# Patient Record
Sex: MALE | Race: WHITE
[De-identification: names, ages, dates, MRNs, and addresses within clinical notes are randomized per-mention and may not be internally consistent; named-entity substitution may affect disease eponyms.]

---

## 2018-09-10 ENCOUNTER — HOSPITAL ENCOUNTER (INPATIENT)
Dept: HOSPITAL 91 - REC | Age: 58
LOS: 1 days | Discharge: HOME | DRG: 520 | End: 2018-09-11
Payer: COMMERCIAL

## 2018-09-10 ENCOUNTER — HOSPITAL ENCOUNTER (INPATIENT)
Age: 58
LOS: 1 days | Discharge: HOME | DRG: 520 | End: 2018-09-11

## 2018-09-10 DIAGNOSIS — F90.9: ICD-10-CM

## 2018-09-10 DIAGNOSIS — E88.2: ICD-10-CM

## 2018-09-10 DIAGNOSIS — M48.061: ICD-10-CM

## 2018-09-10 DIAGNOSIS — M51.16: Primary | ICD-10-CM

## 2018-09-10 PROCEDURE — 0SB40ZZ EXCISION OF LUMBOSACRAL DISC, OPEN APPROACH: ICD-10-PCS

## 2018-09-10 PROCEDURE — 80048 BASIC METABOLIC PNL TOTAL CA: CPT

## 2018-09-10 PROCEDURE — 85018 HEMOGLOBIN: CPT

## 2018-09-10 PROCEDURE — 86900 BLOOD TYPING SEROLOGIC ABO: CPT

## 2018-09-10 PROCEDURE — 88311 DECALCIFY TISSUE: CPT

## 2018-09-10 PROCEDURE — 00BY0ZZ EXCISION OF LUMBAR SPINAL CORD, OPEN APPROACH: ICD-10-PCS

## 2018-09-10 PROCEDURE — 81003 URINALYSIS AUTO W/O SCOPE: CPT

## 2018-09-10 PROCEDURE — 86901 BLOOD TYPING SEROLOGIC RH(D): CPT

## 2018-09-10 PROCEDURE — 85014 HEMATOCRIT: CPT

## 2018-09-10 PROCEDURE — 72020 X-RAY EXAM OF SPINE 1 VIEW: CPT

## 2018-09-10 PROCEDURE — 87086 URINE CULTURE/COLONY COUNT: CPT

## 2018-09-10 PROCEDURE — 97110 THERAPEUTIC EXERCISES: CPT

## 2018-09-10 PROCEDURE — 01NB0ZZ RELEASE LUMBAR NERVE, OPEN APPROACH: ICD-10-PCS

## 2018-09-10 PROCEDURE — 97161 PT EVAL LOW COMPLEX 20 MIN: CPT

## 2018-09-10 PROCEDURE — 88304 TISSUE EXAM BY PATHOLOGIST: CPT

## 2018-09-10 PROCEDURE — 86920 COMPATIBILITY TEST SPIN: CPT

## 2018-09-10 PROCEDURE — 97530 THERAPEUTIC ACTIVITIES: CPT

## 2018-09-10 PROCEDURE — 97116 GAIT TRAINING THERAPY: CPT

## 2018-09-10 PROCEDURE — 86850 RBC ANTIBODY SCREEN: CPT

## 2018-09-10 RX ADMIN — BACITRACIN 1 ML: 50000 INJECTION, POWDER, FOR SOLUTION INTRAMUSCULAR at 06:55

## 2018-09-10 RX ADMIN — BUPIVACAINE HYDROCHLORIDE 1 ML: 2.5 INJECTION, SOLUTION EPIDURAL; INFILTRATION; INTRACAUDAL; PERINEURAL at 06:54

## 2018-09-10 RX ADMIN — PYRIDOXINE HYDROCHLORIDE 1 MLS/HR: 100 INJECTION, SOLUTION INTRAMUSCULAR; INTRAVENOUS at 07:30

## 2018-09-10 RX ADMIN — RANITIDINE 1 MG: 150 TABLET ORAL at 20:28

## 2018-09-10 RX ADMIN — HYDROMORPHONE HYDROCHLORIDE 1 MG: 2 INJECTION INTRAMUSCULAR; INTRAVENOUS; SUBCUTANEOUS at 10:21

## 2018-09-10 RX ADMIN — Medication 1 MG: at 10:03

## 2018-09-10 RX ADMIN — THROMBIN TOPICAL RECOMBINANT 1 UNITS: KIT at 06:55

## 2018-09-10 RX ADMIN — CEFAZOLIN 1 MLS/HR: 1 INJECTION, POWDER, FOR SOLUTION INTRAMUSCULAR; INTRAVENOUS at 13:31

## 2018-09-10 RX ADMIN — Medication 1 MG: at 22:32

## 2018-09-10 RX ADMIN — ASCORBIC ACID, VITAMIN A PALMITATE, CHOLECALCIFEROL, THIAMINE HYDROCHLORIDE, RIBOFLAVIN-5 PHOSPHATE SODIUM, PYRIDOXINE HYDROCHLORIDE, NIACINAMIDE, DEXPANTHENOL, ALPHA-TOCOPHEROL ACETATE, VITAMIN K1, FOLIC ACID, BIOTIN, CYANOCOBALAMIN 1 MLS/HR: 200; 3300; 200; 6; 3.6; 6; 40; 15; 10; 150; 600; 60; 5 INJECTION, SOLUTION INTRAVENOUS at 19:39

## 2018-09-10 RX ADMIN — PYRIDOXINE HYDROCHLORIDE 1 MLS/HR: 100 INJECTION, SOLUTION INTRAMUSCULAR; INTRAVENOUS at 08:00

## 2018-09-10 RX ADMIN — HYDROMORPHONE HYDROCHLORIDE 1 MG: 2 INJECTION INTRAMUSCULAR; INTRAVENOUS; SUBCUTANEOUS at 10:15

## 2018-09-10 RX ADMIN — CEFAZOLIN 1 MLS/HR: 1 INJECTION, POWDER, FOR SOLUTION INTRAMUSCULAR; INTRAVENOUS at 18:01

## 2018-09-10 RX ADMIN — CEFAZOLIN SODIUM 1 MLS/HR: 2 SOLUTION INTRAVENOUS at 07:03

## 2018-09-10 RX ADMIN — ASCORBIC ACID, VITAMIN A PALMITATE, CHOLECALCIFEROL, THIAMINE HYDROCHLORIDE, RIBOFLAVIN-5 PHOSPHATE SODIUM, PYRIDOXINE HYDROCHLORIDE, NIACINAMIDE, DEXPANTHENOL, ALPHA-TOCOPHEROL ACETATE, VITAMIN K1, FOLIC ACID, BIOTIN, CYANOCOBALAMIN 1 MLS/HR: 200; 3300; 200; 6; 3.6; 6; 40; 15; 10; 150; 600; 60; 5 INJECTION, SOLUTION INTRAVENOUS at 13:31

## 2018-09-10 RX ADMIN — GELATIN ABSORBABLE SPONGE SIZE 100 1 SPONGE: MISC at 06:55

## 2018-09-11 LAB
ADD UMIC: NO
ANION GAP: 10 (ref 8–16)
BLOOD UREA NITROGEN: 15 MG/DL (ref 7–20)
CALCIUM: 8.9 MG/DL (ref 8.4–10.2)
CARBON DIOXIDE: 30 MMOL/L (ref 21–31)
CHLORIDE: 105 MMOL/L (ref 97–110)
CREATININE: 1.11 MG/DL (ref 0.61–1.24)
GLUCOSE: 120 MG/DL (ref 70–220)
HEMATOCRIT: 36 % (ref 42–52)
HEMOGLOBIN: 12.3 G/DL (ref 14–18)
POTASSIUM: 3.9 MMOL/L (ref 3.5–5.1)
SODIUM: 141 MMOL/L (ref 135–144)
UR ASCORBIC ACID: 40 MG/DL
UR BILIRUBIN (DIP): NEGATIVE MG/DL
UR BLOOD (DIP): NEGATIVE MG/DL
UR CLARITY: CLEAR
UR COLOR: YELLOW
UR GLUCOSE (DIP): NEGATIVE MG/DL
UR KETONES (DIP): NEGATIVE MG/DL
UR LEUKOCYTE ESTERASE (DIP): NEGATIVE LEU/UL
UR NITRITE (DIP): NEGATIVE MG/DL
UR PH (DIP): 5 (ref 5–9)
UR SPECIFIC GRAVITY (DIP): 1.01 (ref 1–1.03)
UR TOTAL PROTEIN (DIP): NEGATIVE MG/DL
UR UROBILINOGEN (DIP): NEGATIVE MG/DL

## 2018-09-11 RX ADMIN — OXYCODONE HYDROCHLORIDE AND ACETAMINOPHEN 1 MG: 500 TABLET ORAL at 08:44

## 2018-09-11 RX ADMIN — RANITIDINE 1 MG: 150 TABLET ORAL at 08:44

## 2018-09-11 RX ADMIN — FERROUS SULFATE TAB 325 MG (65 MG ELEMENTAL FE) 1 MG: 325 (65 FE) TAB at 13:11

## 2018-09-11 RX ADMIN — HYDROCODONE BITARTRATE AND ACETAMINOPHEN 1 TAB: 5; 325 TABLET ORAL at 08:48

## 2018-09-11 RX ADMIN — FERROUS SULFATE TAB 325 MG (65 MG ELEMENTAL FE) 1 MG: 325 (65 FE) TAB at 08:45

## 2018-09-11 RX ADMIN — HYDROCODONE BITARTRATE AND ACETAMINOPHEN 1 TAB: 5; 325 TABLET ORAL at 12:55

## 2018-09-11 RX ADMIN — ASCORBIC ACID, VITAMIN A PALMITATE, CHOLECALCIFEROL, THIAMINE HYDROCHLORIDE, RIBOFLAVIN-5 PHOSPHATE SODIUM, PYRIDOXINE HYDROCHLORIDE, NIACINAMIDE, DEXPANTHENOL, ALPHA-TOCOPHEROL ACETATE, VITAMIN K1, FOLIC ACID, BIOTIN, CYANOCOBALAMIN 1 MLS/HR: 200; 3300; 200; 6; 3.6; 6; 40; 15; 10; 150; 600; 60; 5 INJECTION, SOLUTION INTRAVENOUS at 05:29

## 2018-09-11 RX ADMIN — CEFAZOLIN 1 MLS/HR: 1 INJECTION, POWDER, FOR SOLUTION INTRAMUSCULAR; INTRAVENOUS at 05:31

## 2018-09-11 RX ADMIN — DOCUSATE SODIUM 1 MG: 100 CAPSULE, LIQUID FILLED ORAL at 08:45

## 2018-09-11 RX ADMIN — CEFAZOLIN 1 MLS/HR: 1 INJECTION, POWDER, FOR SOLUTION INTRAMUSCULAR; INTRAVENOUS at 01:03

## 2019-01-23 ENCOUNTER — HOSPITAL ENCOUNTER (EMERGENCY)
Dept: HOSPITAL 91 - FTE | Age: 59
Discharge: HOME | End: 2019-01-23
Payer: SELF-PAY

## 2019-01-23 ENCOUNTER — HOSPITAL ENCOUNTER (EMERGENCY)
Dept: HOSPITAL 10 - FTE | Age: 59
Discharge: HOME | End: 2019-01-23
Payer: SELF-PAY

## 2019-01-23 VITALS
WEIGHT: 205.43 LBS | WEIGHT: 205.43 LBS | BODY MASS INDEX: 27.82 KG/M2 | HEIGHT: 72 IN | BODY MASS INDEX: 27.82 KG/M2 | HEIGHT: 72 IN

## 2019-01-23 VITALS — DIASTOLIC BLOOD PRESSURE: 65 MMHG | SYSTOLIC BLOOD PRESSURE: 128 MMHG | RESPIRATION RATE: 18 BRPM | HEART RATE: 92 BPM

## 2019-01-23 DIAGNOSIS — R15.9: ICD-10-CM

## 2019-01-23 DIAGNOSIS — R32: ICD-10-CM

## 2019-01-23 DIAGNOSIS — M51.16: Primary | ICD-10-CM

## 2019-01-23 DIAGNOSIS — J45.909: ICD-10-CM

## 2019-01-23 PROCEDURE — 99284 EMERGENCY DEPT VISIT MOD MDM: CPT

## 2019-01-23 PROCEDURE — 72148 MRI LUMBAR SPINE W/O DYE: CPT

## 2019-01-23 NOTE — ERD
ER Documentation


Chief Complaint


Chief Complaint





bilat leg pain/cramping today





HPI


Patient is a 58-year-old male with a history of chronic back pain, status post 


numerous laminectomies, microdiscectomy's,, most recent surgery and September 2018, who presents to the ER for concerns of bilateral leg pain.  Patient is 


[place].  Patient states his bilateral legs are 


cramping up.  Patient states he feels a shooting sharp, stabbing pain in his 


bilateral lower extremities.  Patient states today while he was in the court 


house he injured his back due to his shooting pain down his legs.  Patient 


reports falling.  He states that he had 2 episodes of stool incontinence and 


urinary incontinence prior to being seen by myself.  Patient is able to 


ambulate.  Patient denies any fevers or chills.  Patient presents to the ER with


his sister who is also his caregiver. Patient denies chest pain, shortness of 


breath, abdominal pain or LOC.





ROS


All systems reviewed and are negative except as per history of present illness.





Medications


Home Meds


Active Scripts


Naproxen* (Naprosyn*) 500 Mg Tablet, 500 MG PO BID PRN for PAIN AND/OR IN


FLAMMATION, #30 TAB


   Prov:BEN PAYNE PA-C         1/23/19


Reported Medications


Alprazolam* (Xanax*) 2 Mg Tablet, 2 MG PO QHS, TAB


   9/10/18


Amphet Asp-Amphet-D-Amphet (Adderall) 5 Mg Tablet, 5 MG PO DAILY, TAB


   9/10/18





Allergies


Allergies:  


Coded Allergies:  


     No Known Allergy (Unverified , 9/10/18)





PMhx/Soc


History of Surgery:  Yes (tonsils, 3 right knee surg, 1 left knee surg, 


septoplasty)


Anesthesia Reaction:  No


Hx Neurological Disorder:  No


Hx Respiratory Disorders:  No


Hx Cardiac Disorders:  No


Hx Psychiatric Problems:  No


Hx Miscellaneous Medical Probl:  Yes (ASTHMA , LBP)


Hx Alcohol Use:  No


Hx Substance Use:  No


Hx Tobacco Use:  No


Smoking Status:  Never smoker





FmHx


Family History:  No diabetes





Physical Exam


Vitals





Vital Signs


  Date      Temp  Pulse  Resp  B/P (MAP)   Pulse Ox  O2          O2 Flow    FiO2


Time                                                 Delivery    Rate


   1/23/19  98.6     92    18      128/65        96


     11:19                           (86)





Physical Exam


GENERAL: Well-developed, well-nourished male. Appears in no acute distress. 


HEAD: Normocephalic, atraumatic. 


EYES: Pupils are equally reactive bilaterally. EOMs grossly intact. No 


conjunctival erythema. 


ENT: Moist mucous membranes. No uvula deviation. No kissing tonsils. 


NECK: Supple. No meningismus. Normal range of motion of the neck.


LUNG: Clear to auscultation bilaterally. No rhonchi, wheezing, rales or coarse 


breath sounds. 


HEART: Regular rate and rhythm. No murmurs, rubs or gallops.


BACK: Previous surgical scars noted with no erythema, swelling, redness or 


discharge.  No midline tenderness. 


EXTREMITIES: Equal pulses bilaterally. No peripheral clubbing, cyanosis or 


edema. No unilateral leg swelling.  Bilateral calves are tender to palpation 


with minimal palpation.  No pain with dorsiflexion.  No palpable cords.


NEUROLOGIC: Alert and oriented. Moving all four extremities without any 


difficulty. Normal speech. Steady gait. 


SKIN: Normal color. Warm and dry. No rashes or lesions.


Results 24 hrs





Current Medications


 Medications
   Dose
          Sig/Abby
       Start Time
   Status  Last


 (Trade)       Ordered        Route
 PRN     Stop Time              Admin
Dose


                              Reason                                Admin


 Ibuprofen
     600 mg         ONCE  ONCE
    1/23/19       DC       



(Motrin)                      PO
            15:00



                                             1/23/19 15:29








Procedures/MDM


ED COURSE:


The patient was stable throughout ED course. I kept the patient and/or family 


informed of laboratory and diagnostic imaging results throughout the ED course. 








MEDICATIONS GIVEN: 


Ibuprofen


Patient tolerated medication well with no adverse reactions. Patient reported 


improvement in pain. 








MEDICAL DECISION MAKING:


This is a 58-year-old male with a past medical history of chronic back pain, 


status post numerous back surgeries, presents to the ER for concerns of sharp 


shooting down his bilateral legs which started earlier today.  Patient states 


pain started while he was in a court house causing him to fall.  Patient states 


since that time he has had episodes of stool incontinence and urinary i


ncontinence.





Vital signs were reviewed.  Patient was afebrile.  Patient was not hypoxic.  


Given patient's symptoms, case was discussed with the supervising physician, Dr. Pearce.  He advised me to order an MRI of the patient's lumbar spine.  He advised


me to give patient ibuprofen for his pain.





ROSIBEL report shows that patient has had numerous narcotic medications given to 


him including but not limited to amphetamine salt combo, alprazolam, Norco.





Lumbar spine MRI is pending at this time.  Case will be signed out to onckellie Rosas. Further management and dispo pending.














Lumbar MRI was done, radiologist stated that there is no significant cauda 


equina nerve root complaining with minimal coarse irregularity, I have discussed


this case with supervising physician Dr. Pearce who was not concerned.  Patient 


is stable and neurovascular tach to be discharged to continue to follow-up with 


a neurosurgeon and orthopedist.  Return precautions have been given











LUMBAR MRI


1. Status post L4-S1 laminectomy.


2.  Multilevel degenerative disc and facet disease with most prominent disc 


height loss at L4-L5. No significant spinal canal stenosis.


3.  Severe facet arthropathy at L5-S1 with focal moderate bilateral neural 


foraminal narrowing, and minimal focal effacement of the right subarticular 


recess.


4.  Moderate facet arthropathy at L4-L5 and disc osteophyte complex asymmetric 


to the right subarticular/foraminal zone and likely adjacent focal scarring as 


detailed above. Focal effacement of the right subarticular recess. Moderate left


and at least moderate right neural foraminal narrowing.


5.  Multilevel additional neural foraminal narrowing as detailed above level by 


level.


6.  No significant cauda equina nerve root clumping, with minimal coarse irregu


larity. Correlation with prior MRI would be helpful. MRI with contrast may be 


obtained if there is clinical concern for arachnoiditis.





Departure


Diagnosis:  


   Primary Impression:  


   Lumbar disc disease with radiculopathy


   Additional Impressions:  


   Urinary incontinence


   Urinary Incontinence type:  unspecified incontinence  Qualified Codes:  R32 -


   Unspecified urinary incontinence


   Stool incontinence


   Fecal incontinence type:  unspecified  Qualified Codes:  R15.9 - Full 


   incontinence of feces


Condition:  Serious











BEN PAYNE PA-C             Jan 23, 2019 16:05


HOLLY ROSAS PA-C      Jan 23, 2019 17:27